# Patient Record
Sex: FEMALE
[De-identification: names, ages, dates, MRNs, and addresses within clinical notes are randomized per-mention and may not be internally consistent; named-entity substitution may affect disease eponyms.]

---

## 2021-03-05 PROBLEM — Z00.00 ENCOUNTER FOR PREVENTIVE HEALTH EXAMINATION: Status: ACTIVE | Noted: 2021-03-05

## 2021-03-12 ENCOUNTER — APPOINTMENT (OUTPATIENT)
Dept: DISASTER EMERGENCY | Facility: CLINIC | Age: 76
End: 2021-03-12

## 2021-03-13 LAB — SARS-COV-2 N GENE NPH QL NAA+PROBE: NOT DETECTED

## 2021-04-16 ENCOUNTER — APPOINTMENT (OUTPATIENT)
Dept: DISASTER EMERGENCY | Facility: CLINIC | Age: 76
End: 2021-04-16

## 2021-04-17 LAB — SARS-COV-2 N GENE NPH QL NAA+PROBE: NOT DETECTED

## 2021-05-13 DIAGNOSIS — Z01.818 ENCOUNTER FOR OTHER PREPROCEDURAL EXAMINATION: ICD-10-CM

## 2021-05-14 ENCOUNTER — APPOINTMENT (OUTPATIENT)
Dept: DISASTER EMERGENCY | Facility: CLINIC | Age: 76
End: 2021-05-14

## 2021-05-15 LAB — SARS-COV-2 N GENE NPH QL NAA+PROBE: NOT DETECTED

## 2022-05-13 ENCOUNTER — APPOINTMENT (OUTPATIENT)
Dept: PAIN MANAGEMENT | Facility: CLINIC | Age: 77
End: 2022-05-13
Payer: MEDICARE

## 2022-05-13 VITALS — BODY MASS INDEX: 24.17 KG/M2 | WEIGHT: 128 LBS | HEIGHT: 61 IN

## 2022-05-13 PROCEDURE — 99214 OFFICE O/P EST MOD 30 MIN: CPT

## 2022-05-13 NOTE — DISCUSSION/SUMMARY
[de-identified] : priro tfesi on year prior with 85% relief of pain and adls\par will repeat  left l3-4 l4-5 tfesi

## 2022-05-13 NOTE — PHYSICAL EXAM
[de-identified] : PHYSICAL EXAM\par \par Constitutional: \par Appears well, no apparent distress\par Ability to communicate: Normal\par Respiratory: non-labored breathing\par Skin: no rash noted\par Head: normocephalic, atraumatic\par Neck: no visible thyroid enlargement\par Eyes: extraocular movements intact\par Neurologic: alert and oriented x3\par Psychiatric: normal mood, affect, and behavior\par \par Lumbar Spine: \par Palpation: left lumbar paraspinal spasm and left lumbar paraspinal tenderness to palpation.\par ROM: Diminished range of motion in all plains.  Patient notes pain with lateral bending to the left.\par MMT: Motor exam is 5/5 through out bilateral lower extremities.\par Sensation: Light touch and pain is intact throughout bilateral lower extremities.\par Reflexes: achilles and patella reflexes are intact and  symmetrical.  No sustained clonus.\par Special Testing: Positive straight leg raise on the left side.\par \par Assessemnt:\par Radiculopathy of lumbosacral region (M54.17)\par Myalgia (M79.10)\par \par Plan:\par After discussing various treatment options with the patient including but not limited to oral medications, physical therapy, exercise modalities as well as interventional spinal injections, we have decided with the following plan:\par The patient is presenting with acute/sub-acute radicular pain with impairment in ADLs and functionality.  The pain has not responded to conservative care including NSAID therapy and/or physical therapy.  There is no bleeding tendency, unstable medical condition, or systemic infection\par \par The risks, benefits and alternatives of the proposed procedure were explained in detail with the patient.  The risks outlined include but are not limited to infection, bleeding, post dural puncture headache, nerve injury, a temporary increase in pain, failure to resolve symptoms, allergic reaction, symptom recurrence, and possible elevation of blood sugar.  All questions were answered to patient's satisfaction and he/she verbalized an understanding.\par \par Follow up 1-2 weeks post injection foe re-evaluation.\par \par Continue home exercises, stretching, activity modification, physical therapy, and conservative care.\par \par \par

## 2022-05-13 NOTE — HISTORY OF PRESENT ILLNESS
[Lower back] : lower back [10] : 10 [Radiating] : radiating [Intermittent] : intermittent [Injection therapy] : injection therapy [Standing] : standing [Walking] : walking [Retired] : Work status: retired [de-identified] : following for lower back pain which goes into the left leg  [] : no

## 2022-05-23 LAB — SARS-COV-2 N GENE NPH QL NAA+PROBE: NOT DETECTED

## 2022-05-25 ENCOUNTER — APPOINTMENT (OUTPATIENT)
Dept: PAIN MANAGEMENT | Facility: CLINIC | Age: 77
End: 2022-05-25
Payer: MEDICARE

## 2022-05-25 PROCEDURE — 64483 NJX AA&/STRD TFRM EPI L/S 1: CPT | Mod: LT

## 2022-05-25 PROCEDURE — 64484 NJX AA&/STRD TFRM EPI L/S EA: CPT | Mod: LT

## 2022-05-25 NOTE — PROCEDURE
[FreeTextEntry3] : Date of Service: 05/25/2022 \par \par Account: [id number]\par \par Patient: GENIA ALTMAN \par \par YOB: 1945\par \par Age: 76 year\par \par \par Surgeon: Marco Gonzalez M.D.\par \par Assistant: None.\par \par Pre-Operative Diagnosis: Lumbosacral radiculitis\par \par Post-Operative Diagnosis: Lumbosacral radiculitis\par \par Procedure:  Left L3-4, L4-5 transforaminal epidural steroid injection under fluoroscopic guidance.                     \par \par Anesthesia:  MAC\par \par \par This procedure was carried out using fluoroscopic guidance.  The risks and benefits of the procedure were discussed extensively with the patient.  The consent of the patient was obtained and the following procedure was performed. The patient was placed in the prone position on the fluoroscopic table and the lumbar area was prepped and draped in a sterile fashion.\par \par The left L3-4 neural foramen was identified on left oblique "chip dog" anatomical view at the 6 o' clock position using fluoroscopic guidance, and the area was marked. The overlying skin and subcutaneous structures were anesthetized using sterile technique with 1% Lidocaine.  A 22 gauge spinal needle was directed toward the inferior (6 o'clock) position of the pedicle, which formed the roof of the identified foramen.  Once in the epidural space, after negative aspiration for heme and CSF, 1cc of Omnipaque contrast was injected to confirm epidural location and assess filling defects and rule out intravascular needle placement. \par \par Lumbar Epidurogram showed no intravascular or intrathecal flow pattern.  No blood or CSF was aspirated. Omnipaque spread medially in epidural space and outlined the exiting nerve root.  \par \par After this, an injectate of 3 cc preservative free normal saline plus 40 mg of kenalog was injected in the epidural space.\par \par The left L4-5 neural foramen was identified on left oblique "chip dog" anatomical view at the 6 o' clock position using fluoroscopic guidance, and the area was marked. The overlying skin and subcutaneous structures were anesthetized using sterile technique with 1% Lidocaine.  A 22 gauge spinal needle was directed toward the inferior (6 o'clock) position of the pedicle, which formed the roof of the identified foramen.  Once in the epidural space, after negative aspiration for heme and CSF, 1cc of Omnipaque contrast was injected to confirm epidural location and assess filling defects and rule out intravascular needle placement. \par \par Lumbar Epidurogram showed no intravascular or intrathecal flow pattern.  No blood or CSF was aspirated. Omnipaque spread medially in epidural space and outlined the exiting nerve root.  \par \par After this, an injectate of 3 cc preservative free normal saline plus 40mg of kenalog was injected in the epidural space.\par \par The needle was subsequently removed.  Vital signs remained normal.  Pulse oximeter was used throughout the procedure and the patient's pulse and oxygen saturation remained within normal limits.  The patient tolerated the procedure well.  There were no complications.  The patient was instructed to apply ice over the injection sites for twenty minutes every two hours for the next 24 to 48 hours.  The patient was also instructed to contact me immediately if there were any problems.\par \par \par Marco Gonzalez M.D.\par

## 2022-06-08 ENCOUNTER — APPOINTMENT (OUTPATIENT)
Dept: PAIN MANAGEMENT | Facility: CLINIC | Age: 77
End: 2022-06-08
Payer: MEDICARE

## 2022-06-08 VITALS — BODY MASS INDEX: 24.17 KG/M2 | HEIGHT: 61 IN | WEIGHT: 128 LBS

## 2022-06-08 DIAGNOSIS — M70.62 TROCHANTERIC BURSITIS, LEFT HIP: ICD-10-CM

## 2022-06-08 PROCEDURE — 20610 DRAIN/INJ JOINT/BURSA W/O US: CPT

## 2022-06-08 PROCEDURE — J3490M: CUSTOM

## 2022-06-08 PROCEDURE — 99214 OFFICE O/P EST MOD 30 MIN: CPT | Mod: 25

## 2022-06-08 NOTE — PROCEDURE
[Large Joint Injection] : Large joint injection [Left] : of the left [Greater Trochanteric Bursa] : greater trochanteric bursa [___ cc    0.25%] : Bupivacaine (Marcaine) ~Vcc of 0.25%  [___ cc    40mg] : Triamcinolone (Kenalog) ~Vcc of 40 mg  [] : Patient tolerated procedure well [Call if redness, pain or fever occur] : call if redness, pain or fever occur [Patient had decreased mobility in the joint] : patient had decreased mobility in the joint [Risks, benefits, alternatives discussed / Verbal consent obtained] : the risks benefits, and alternatives have been discussed, and verbal consent was obtained

## 2022-06-08 NOTE — HISTORY OF PRESENT ILLNESS
[Lower back] : lower back [10] : 10 [5] : 5 [Intermittent] : intermittent [Rest] : rest [Injection therapy] : injection therapy [Sitting] : sitting [Standing] : standing [Walking] : walking [Lying in bed] : lying in bed [Part time] : Work status: part time [] : no [FreeTextEntry6] : soreness , pressure  [de-identified] : mri l spine  [TWNoteComboBox1] : 70%

## 2022-06-08 NOTE — REASON FOR VISIT
[FreeTextEntry2] : f/u to injection\par Procedure: Left L3-4,L4-5 transforaminal epidural steroid injection under fluoroscopic guidance \par Anesthesia:MAC (DOS: 05/25/2022)

## 2022-08-31 ENCOUNTER — APPOINTMENT (OUTPATIENT)
Dept: PAIN MANAGEMENT | Facility: CLINIC | Age: 77
End: 2022-08-31

## 2022-08-31 PROCEDURE — 64484 NJX AA&/STRD TFRM EPI L/S EA: CPT | Mod: LT

## 2022-08-31 PROCEDURE — 64483 NJX AA&/STRD TFRM EPI L/S 1: CPT | Mod: LT

## 2022-08-31 NOTE — PROCEDURE
[FreeTextEntry3] : Date of Service: 08/31/2022 \par \par Account: [id number]\par \par Patient: GENIA ALTMAN \par \par YOB: 1945\par \par Age: 76 year\par \par \par Surgeon: Marco Gonzalez M.D.\par \par Assistant: None.\par \par Pre-Operative Diagnosis: Lumbosacral radiculitis\par \par Post-Operative Diagnosis: Lumbosacral radiculitis\par \par Procedure:  Left L3-4, L4-5 transforaminal epidural steroid injection under fluoroscopic guidance.                     \par \par Anesthesia:  MAC\par \par \par This procedure was carried out using fluoroscopic guidance.  The risks and benefits of the procedure were discussed extensively with the patient.  The consent of the patient was obtained and the following procedure was performed. The patient was placed in the prone position on the fluoroscopic table and the lumbar area was prepped and draped in a sterile fashion.\par \par The left L3-4 neural foramen was identified on left oblique "chip dog" anatomical view at the 6 o' clock position using fluoroscopic guidance, and the area was marked. The overlying skin and subcutaneous structures were anesthetized using sterile technique with 1% Lidocaine.  A 22 gauge spinal needle was directed toward the inferior (6 o'clock) position of the pedicle, which formed the roof of the identified foramen.  Once in the epidural space, after negative aspiration for heme and CSF, 1cc of Omnipaque contrast was injected to confirm epidural location and assess filling defects and rule out intravascular needle placement. \par \par Lumbar Epidurogram showed no intravascular or intrathecal flow pattern.  No blood or CSF was aspirated. Omnipaque spread medially in epidural space and outlined the exiting nerve root.  \par \par After this, an injectate of 3 cc preservative free normal saline plus 40 mg of kenalog was injected in the epidural space.\par \par The left L4-5 neural foramen was identified on left oblique "chip dog" anatomical view at the 6 o' clock position using fluoroscopic guidance, and the area was marked. The overlying skin and subcutaneous structures were anesthetized using sterile technique with 1% Lidocaine.  A 22 gauge spinal needle was directed toward the inferior (6 o'clock) position of the pedicle, which formed the roof of the identified foramen.  Once in the epidural space, after negative aspiration for heme and CSF, 1cc of Omnipaque contrast was injected to confirm epidural location and assess filling defects and rule out intravascular needle placement. \par \par Lumbar Epidurogram showed no intravascular or intrathecal flow pattern.  No blood or CSF was aspirated. Omnipaque spread medially in epidural space and outlined the exiting nerve root.  \par \par After this, an injectate of 3 cc preservative free normal saline plus 40mg of kenalog was injected in the epidural space.\par \par The needle was subsequently removed.  Vital signs remained normal.  Pulse oximeter was used throughout the procedure and the patient's pulse and oxygen saturation remained within normal limits.  The patient tolerated the procedure well.  There were no complications.  The patient was instructed to apply ice over the injection sites for twenty minutes every two hours for the next 24 to 48 hours.  The patient was also instructed to contact me immediately if there were any problems.\par \par \par Marco Gonzalez M.D.\par

## 2022-10-12 ENCOUNTER — APPOINTMENT (OUTPATIENT)
Dept: PAIN MANAGEMENT | Facility: CLINIC | Age: 77
End: 2022-10-12

## 2023-03-10 ENCOUNTER — APPOINTMENT (OUTPATIENT)
Dept: PAIN MANAGEMENT | Facility: CLINIC | Age: 78
End: 2023-03-10
Payer: MEDICARE

## 2023-03-10 ENCOUNTER — FORM ENCOUNTER (OUTPATIENT)
Age: 78
End: 2023-03-10

## 2023-03-10 VITALS — BODY MASS INDEX: 23.6 KG/M2 | WEIGHT: 125 LBS | HEIGHT: 61 IN

## 2023-03-10 DIAGNOSIS — M54.17 RADICULOPATHY, LUMBOSACRAL REGION: ICD-10-CM

## 2023-03-10 PROCEDURE — 20552 NJX 1/MLT TRIGGER POINT 1/2: CPT

## 2023-03-10 PROCEDURE — J3490M: CUSTOM

## 2023-03-10 PROCEDURE — 99214 OFFICE O/P EST MOD 30 MIN: CPT | Mod: 25

## 2023-03-10 RX ORDER — GABAPENTIN 300 MG/1
300 CAPSULE ORAL
Refills: 0 | Status: ACTIVE | COMMUNITY

## 2023-03-10 RX ORDER — LOSARTAN POTASSIUM 100 MG/1
100 TABLET, FILM COATED ORAL
Refills: 0 | Status: ACTIVE | COMMUNITY

## 2023-03-10 RX ORDER — LEVOTHYROXINE SODIUM 0.05 MG/1
50 TABLET ORAL
Refills: 0 | Status: ACTIVE | COMMUNITY

## 2023-03-10 RX ORDER — BACLOFEN 10 MG/1
10 TABLET ORAL
Refills: 0 | Status: ACTIVE | COMMUNITY

## 2023-03-10 RX ORDER — MELOXICAM 15 MG/1
15 TABLET ORAL
Refills: 0 | Status: ACTIVE | COMMUNITY

## 2023-03-10 RX ORDER — SERTRALINE 25 MG/1
25 TABLET, FILM COATED ORAL
Refills: 0 | Status: ACTIVE | COMMUNITY

## 2023-03-10 NOTE — PHYSICAL EXAM
[de-identified] : PHYSICAL EXAM\par \par Constitutional: \par Appears well, no apparent distress\par Ability to communicate: Normal\par Respiratory: non-labored breathing\par Skin: no rash noted\par Head: normocephalic, atraumatic\par Neck: no visible thyroid enlargement\par Eyes: extraocular movements intact\par Neurologic: alert and oriented x3\par Psychiatric: normal mood, affect, and behavior\par \par Lumbar Spine: \par Palpation: left and right lumbar paraspinal spasm and left and right lumbar paraspinal tenderness to palpation.\par ROM: Diminished range of motion in all plains.  Patient notes pain with lateral bending to the left and right.\par MMT: Motor exam is 5/5 through out bilateral lower extremities.\par Sensation: Light touch and pain is intact throughout bilateral lower extremities.\par Reflexes: achilles and patella reflexes are intact and  symmetrical.  No sustained clonus.\par Special Testing: Positive straight leg raise on the left and right side.\par \par Assessemnt:\par Radiculopathy of lumbosacral region (M54.17)\par Myalgia (M79.10)\par \par

## 2023-03-10 NOTE — ASSESSMENT
[FreeTextEntry1] : pain is across lower back and into bl thigh anteriorly\par \par prior L TFESI she had some relief \par \par

## 2023-03-10 NOTE — HISTORY OF PRESENT ILLNESS
[Lower back] : lower back [10] : 10 [Radiating] : radiating [Constant] : constant [FreeTextEntry1] : 3/10/23: patient is following for lower back pain , the pain radiates to both sides she states it stops at the thighs  [] : no [FreeTextEntry6] : spasm  [FreeTextEntry7] : b/l sides [FreeTextEntry9] : ointments  [de-identified] : getting out of a car

## 2023-03-11 ENCOUNTER — APPOINTMENT (OUTPATIENT)
Dept: MRI IMAGING | Facility: CLINIC | Age: 78
End: 2023-03-11
Payer: MEDICARE

## 2023-03-11 PROCEDURE — 72148 MRI LUMBAR SPINE W/O DYE: CPT

## 2023-03-29 ENCOUNTER — APPOINTMENT (OUTPATIENT)
Dept: PAIN MANAGEMENT | Facility: CLINIC | Age: 78
End: 2023-03-29
Payer: MEDICARE

## 2023-03-29 VITALS — HEIGHT: 61 IN | BODY MASS INDEX: 23.6 KG/M2 | WEIGHT: 125 LBS

## 2023-03-29 PROCEDURE — 20552 NJX 1/MLT TRIGGER POINT 1/2: CPT

## 2023-03-29 PROCEDURE — J3490M: CUSTOM

## 2023-03-29 PROCEDURE — 99214 OFFICE O/P EST MOD 30 MIN: CPT | Mod: 25

## 2023-03-29 NOTE — HISTORY OF PRESENT ILLNESS
[Lower back] : lower back [3] : 3 [Radiating] : radiating [Intermittent] : intermittent [Rest] : rest [Standing] : standing [Walking] : walking [Bending forward] : bending forward [FreeTextEntry1] : pt is following up for mri results  [] : no [FreeTextEntry7] : into the legs

## 2023-03-29 NOTE — PHYSICAL EXAM
[de-identified] : PHYSICAL EXAM\par \par Constitutional: \par Appears well, no apparent distress\par Ability to communicate: Normal\par Respiratory: non-labored breathing\par Skin: no rash noted\par Head: normocephalic, atraumatic\par Neck: no visible thyroid enlargement\par Eyes: extraocular movements intact\par Neurologic: alert and oriented x3\par Psychiatric: normal mood, affect, and behavior\par \par Lumbar Spine: \par Palpation: left and right lumbar paraspinal spasm and left and right lumbar paraspinal tenderness to palpation.\par ROM: Diminished range of motion in all plains.  Patient notes pain with lateral bending to the left and right.\par MMT: Motor exam is 5/5 through out bilateral lower extremities.\par Sensation: Light touch and pain is intact throughout bilateral lower extremities.\par Reflexes: achilles and patella reflexes are intact and  symmetrical.  No sustained clonus.\par Special Testing: Positive kemps maneuver on the left and right side\par \par Assessemnt:\par Lumbar spondylosis (m47.816)\par Myalgia (M79.10)\par \par Plan:\par After discussing various treatment options with the patient including but not limited to oral medications, physical therapy, exercise modalities as well as interventional spinal injections, we have decided with the following plan:\par The patient is presenting with axial lumbar pain that has not responded to three months of conservative therapy including physical therapy or nsaid therapy. The pain is interfering with activities of daily living and functionality.  There is no radicular pain.  The pain is exacerbated by facet loading.  Positive kemps maneuver which is defined by pain reproduction with extension and rotation of the lumbar spine to the affected side.  The patient has not had a vertebral fusion at the levels of the proposed treatment.  There is no unexplained neurologic deficit.  There is no bleeding tendency, unstable medical condition, or systemic infection.  The injection is being performed to diagnose the facet joint as the source of the individuals pain.\par \par The risks, benefits and alternatives of the proposed procedure were explained in detail with the patient.  The risks outlined include but are not limited to infection, bleeding, post dural puncture headache, nerve injury, a temporary increase in pain, failure to resolve symptoms, allergic reaction, symptom recurrence, and possible elevation of blood sugar.  All questions were answered to patient's satisfaction and he/she verbalized an understanding.\par \par Follow up 1-2 weeks post injection foe re-evaluation.\par \par Continue home exercises, stretching, activity modification, physical therapy, and conservative care.\par \par \par \par

## 2023-03-29 NOTE — DISCUSSION/SUMMARY
[Surgical risks reviewed] : Surgical risks reviewed [de-identified] : proceed BL LS MBB\par \par After discussing various treatment options with the patient including but not limited to oral medications, physical therapy, exercise, modalities as well as interventional spinal injections, we have decided with the following plan:\par I personally reviewed the MRI/CT scan images and agree with the radiologist's report. The radiological findings were discussed with the patient.\par The risks, benefits, contents and alternatives to injection were explained in full to the patient. Risks outlined include but are not limited to infection,sepsis, bleeding, post-dural puncture headache, nerve damage, temporary increase in pain, syncopal episode, failure to resolve symptoms, allergic reaction, symptom recurrence, and elevation of blood sugar in diabetics. Cortisone may cause immunosuppression. Patient understands the risks. All questions were answered. After discussion of options, patient requested an injection. Information regarding the injection was given to the patient. Which medications to stop prior to the injection was explained to the patient as well.\par Follow up in 1-2 weeks post injection for re-evaluation.\par Continue Home exercises, stretching, activity modification, physical therapy, and conservative care.\par Patient is presenting with acute/sub-acute radicular pain with impairment in ADLs and functionality. The pain has not responded to conservative care including nsaid therapy and/or physical therapy. There is no bleeding tendency, unstable medical condition, or systemic infection.\par

## 2023-04-10 ENCOUNTER — APPOINTMENT (OUTPATIENT)
Dept: PAIN MANAGEMENT | Facility: CLINIC | Age: 78
End: 2023-04-10
Payer: MEDICARE

## 2023-04-10 PROCEDURE — J3490M: CUSTOM

## 2023-04-10 PROCEDURE — 64494 INJ PARAVERT F JNT L/S 2 LEV: CPT | Mod: 50

## 2023-04-10 PROCEDURE — 64493 INJ PARAVERT F JNT L/S 1 LEV: CPT | Mod: 50

## 2023-04-10 NOTE — PROCEDURE
[FreeTextEntry3] : Date of Service: 04/10/2023 \par \par Account: [id number]\par \par Patient: GENIA ALTMAN \par \par YOB: 1945\par \par Age: 77 year\par \par \par Surgeon:  Marco Gonzalez M.D.\par \par Assistant: None.\par \par Pre-Operative Diagnosis: Spondylosis of lumbar region without myelopathy or radiculopathy\par \par Post Operative Diagnosis:  Spondylosis of lumbar region without myelopathy or radiculopathy\par \par Procedure: Right L3,L4,L5 Medial Branch block \par                    Left L3,L4,L5  Medial Branch block under fluoroscopic guidance\par \par Anesthesia:      MAC\par \par This procedure was carried out using fluoroscopic guidance.  The risks and benefits of the procedure were discussed extensively with the patient.  The consent of the patient was obtained and the following procedure was performed.\par \par  The patient was placed in the prone position.  The patient's back was prepped and draped in a sterile fashion.  The left L4 and L5 lumbar vertebral bodies were identified and the fluoroscope left obliqued to approximately 30 degrees to reveal good "Estuardo-dog" anatomical view.  The junction of the superior articulate process and tranverse process at the L4 and L5 level was then identified and marked. The skin at these target points was then localized using 1 cc of 1% Lidocaine without epinephrine at each injection site.  A spinal needle was then introduced and advanced to the above target points at the junction of the SAP and transverse processes until oss was contacted.  After negative aspiration for heme and CSF, an injectate of 1cc 0.25% marcaine  was injected at each of the two levels. \par \par The right L4 and L5 lumbar vertebral bodies were identified and the fluoroscope right obliqued to approximately 30 degrees to reveal good "Estuardo-dog" anatomical view.  The junction of the superior articulate process and tranverse process at the L4 and L5 level was then identified and marked. The skin at these target points was then localized using 1 cc of 1% Lidocaine without epinephrine at each injection site.  A spinal needle was then introduced and advanced to the above target points at the junction of the SAP and transverse processes until oss was contacted.  After negative aspiration for heme and CSF, an injectate of 1cc 0.25% was injected at each of the two levels. \par \par  Fluoroscope then focused on the bilateral sacral ala on AP view, and marked at these points.  The skin and subcutaneous structures were localized using 1cc of 1.0 % lidocaine without epinephrine.  A spinal needle was then advanced under fluoroscopic guidance until oss was contacted at the ala bilaterally.  After negative aspiration for heme and CSF, an injectate of 1cc 0.25% marcaine was injected at each site.\par \par The needles were then removed and pressure was applied.  Anesthesia personnel were present throughout the procedure, monitoring vitals which were stable throughout.\par \par \par Marco Gonzalez M.D.\par

## 2023-05-03 ENCOUNTER — APPOINTMENT (OUTPATIENT)
Dept: PAIN MANAGEMENT | Facility: CLINIC | Age: 78
End: 2023-05-03
Payer: MEDICARE

## 2023-05-03 VITALS — BODY MASS INDEX: 23.6 KG/M2 | HEIGHT: 61 IN | WEIGHT: 125 LBS

## 2023-05-03 PROCEDURE — J3490M: CUSTOM

## 2023-05-03 PROCEDURE — 20552 NJX 1/MLT TRIGGER POINT 1/2: CPT

## 2023-05-03 PROCEDURE — 99214 OFFICE O/P EST MOD 30 MIN: CPT | Mod: 25

## 2023-05-03 NOTE — ASSESSMENT
[FreeTextEntry1] : BL LS MBB with >80% relief improved rom and adls sustained , L sided pain has returned > R side \par aggravated with getting out of car, getting up in morning

## 2023-05-03 NOTE — PHYSICAL EXAM
[de-identified] : PHYSICAL EXAM\par \par Constitutional: \par Appears well, no apparent distress\par Ability to communicate: Normal\par Respiratory: non-labored breathing\par Skin: no rash noted\par Head: normocephalic, atraumatic\par Neck: no visible thyroid enlargement\par Eyes: extraocular movements intact\par Neurologic: alert and oriented x3\par Psychiatric: normal mood, affect, and behavior\par \par Lumbar Spine: \par Palpation: left and right lumbar paraspinal spasm and left and right lumbar paraspinal tenderness to palpation.\par ROM: Diminished range of motion in all plains.  Patient notes pain with lateral bending to the left and right.\par MMT: Motor exam is 5/5 through out bilateral lower extremities.\par Sensation: Light touch and pain is intact throughout bilateral lower extremities.\par Reflexes: achilles and patella reflexes are intact and  symmetrical.  No sustained clonus.\par Special Testing: Positive kemps maneuver on the left and right side\par \par Assessemnt:\par Lumbar spondylosis (m47.816)\par Myalgia (M79.10)\par \par Plan:\par After discussing various treatment options with the patient including but not limited to oral medications, physical therapy, exercise modalities as well as interventional spinal injections, we have decided with the following plan:\par The patient is presenting with axial lumbar pain that has not responded to three months of conservative therapy including physical therapy or nsaid therapy. The pain is interfering with activities of daily living and functionality.  There is no radicular pain.  The pain is exacerbated by facet loading.  Positive kemps maneuver which is defined by pain reproduction with extension and rotation of the lumbar spine to the affected side.  The patient has not had a vertebral fusion at the levels of the proposed treatment.  There is no unexplained neurologic deficit.  There is no bleeding tendency, unstable medical condition, or systemic infection.  The injection is being performed to diagnose the facet joint as the source of the individuals pain.\par \par The risks, benefits and alternatives of the proposed procedure were explained in detail with the patient.  The risks outlined include but are not limited to infection, bleeding, post dural puncture headache, nerve injury, a temporary increase in pain, failure to resolve symptoms, allergic reaction, symptom recurrence, and possible elevation of blood sugar.  All questions were answered to patient's satisfaction and he/she verbalized an understanding.\par \par Follow up 1-2 weeks post injection foe re-evaluation.\par \par Continue home exercises, stretching, activity modification, physical therapy, and conservative care.\par \par \par \par

## 2023-05-03 NOTE — HISTORY OF PRESENT ILLNESS
[Lower back] : lower back [5] : 5 [2] : 2 [Radiating] : radiating [Constant] : constant [Injection therapy] : injection therapy [FreeTextEntry1] : pt is following up after bilateral l spine procedure , she states left side is still bothering her  [] : no [de-identified] : getting up in the morning

## 2023-05-03 NOTE — REASON FOR VISIT
[Follow-Up Visit] : a follow-up pain management visit [FreeTextEntry2] : BILATERAL L3,L4,L5 MBB ( DOS 04/10/23- MAC )

## 2023-05-03 NOTE — PROCEDURE
[Trigger point 1-2 muscle groups] : trigger point 1-2 muscle groups [Bilateral] : bilaterally of the [Gluteus Jt muscle] : gluteus jt muscle [Pain] : pain [Inflammation] : inflammation [Alcohol] : alcohol [Ethyl Chloride sprayed topically] : ethyl chloride sprayed topically [Sterile technique used] : sterile technique used [___ cc    1%] : Lidocaine ~Vcc of 1%  [___ cc    0.25%] : Bupivacaine (Marcaine) ~Vcc of 0.25%  [___ cc    10mg] : Triamcinolone (Kenalog) ~Vcc of 10 mg  [] : Patient tolerated procedure well [Call if redness, pain or fever occur] : call if redness, pain or fever occur [Risks, benefits, alternatives discussed / Verbal consent obtained] : the risks benefits, and alternatives have been discussed, and verbal consent was obtained

## 2023-05-03 NOTE — DISCUSSION/SUMMARY
[Surgical risks reviewed] : Surgical risks reviewed [de-identified] : proceed BL LS MBB #2\par \par After discussing various treatment options with the patient including but not limited to oral medications, physical therapy, exercise, modalities as well as interventional spinal injections, we have decided with the following plan:\par I personally reviewed the MRI/CT scan images and agree with the radiologist's report. The radiological findings were discussed with the patient.\par The risks, benefits, contents and alternatives to injection were explained in full to the patient. Risks outlined include but are not limited to infection,sepsis, bleeding, post-dural puncture headache, nerve damage, temporary increase in pain, syncopal episode, failure to resolve symptoms, allergic reaction, symptom recurrence, and elevation of blood sugar in diabetics. Cortisone may cause immunosuppression. Patient understands the risks. All questions were answered. After discussion of options, patient requested an injection. Information regarding the injection was given to the patient. Which medications to stop prior to the injection was explained to the patient as well.\par Follow up in 1-2 weeks post injection for re-evaluation.\par Continue Home exercises, stretching, activity modification, physical therapy, and conservative care.\par Patient is presenting with acute/sub-acute radicular pain with impairment in ADLs and functionality. The pain has not responded to conservative care including nsaid therapy and/or physical therapy. There is no bleeding tendency, unstable medical condition, or systemic infection.\par

## 2023-05-12 ENCOUNTER — APPOINTMENT (OUTPATIENT)
Dept: PAIN MANAGEMENT | Facility: CLINIC | Age: 78
End: 2023-05-12
Payer: MEDICARE

## 2023-05-12 PROCEDURE — 64494 INJ PARAVERT F JNT L/S 2 LEV: CPT | Mod: LT

## 2023-05-12 PROCEDURE — 64493 INJ PARAVERT F JNT L/S 1 LEV: CPT | Mod: 50

## 2023-05-12 PROCEDURE — J3490M: CUSTOM

## 2023-05-12 PROCEDURE — 64495 INJ PARAVERT F JNT L/S 3 LEV: CPT | Mod: LT

## 2023-05-12 NOTE — PROCEDURE
[FreeTextEntry3] : Date of Service: 05/12/2023 \par \par Account: [id number]\par \par Patient: GENIA ALTMAN \par \par YOB: 1945\par \par Age: 77 year\par \par \par Surgeon:  Marco Gonzaelz M.D.\par \par Assistant: None.\par \par Pre-Operative Diagnosis: Spondylosis of lumbar region without myelopathy or radiculopathy\par \par Post Operative Diagnosis:  Spondylosis of lumbar region without myelopathy or radiculopathy\par \par Procedure: Right L3,L4,L5 Medial Branch block \par                    Left L3,L4,L5  Medial Branch block under fluoroscopic guidance\par \par Anesthesia:      MAC\par \par This procedure was carried out using fluoroscopic guidance.  The risks and benefits of the procedure were discussed extensively with the patient.  The consent of the patient was obtained and the following procedure was performed.\par \par  The patient was placed in the prone position.  The patient's back was prepped and draped in a sterile fashion.  The left L4 and L5 lumbar vertebral bodies were identified and the fluoroscope left obliqued to approximately 30 degrees to reveal good "Estuardo-dog" anatomical view.  The junction of the superior articulate process and tranverse process at the L4 and L5 level was then identified and marked. The skin at these target points was then localized using 1 cc of 1% Lidocaine without epinephrine at each injection site.  A spinal needle was then introduced and advanced to the above target points at the junction of the SAP and transverse processes until oss was contacted.  After negative aspiration for heme and CSF, an injectate of 1cc 0.25% marcaine  was injected at each of the two levels. \par \par The right L4 and L5 lumbar vertebral bodies were identified and the fluoroscope right obliqued to approximately 30 degrees to reveal good "Estuardo-dog" anatomical view.  The junction of the superior articulate process and tranverse process at the L4 and L5 level was then identified and marked. The skin at these target points was then localized using 1 cc of 1% Lidocaine without epinephrine at each injection site.  A spinal needle was then introduced and advanced to the above target points at the junction of the SAP and transverse processes until oss was contacted.  After negative aspiration for heme and CSF, an injectate of 1cc 0.25% was injected at each of the two levels. \par \par  Fluoroscope then focused on the bilateral sacral ala on AP view, and marked at these points.  The skin and subcutaneous structures were localized using 1cc of 1.0 % lidocaine without epinephrine.  A spinal needle was then advanced under fluoroscopic guidance until oss was contacted at the ala bilaterally.  After negative aspiration for heme and CSF, an injectate of 1cc 0.25% marcaine was injected at each site.\par \par The needles were then removed and pressure was applied.  Anesthesia personnel were present throughout the procedure, monitoring vitals which were stable throughout.\par \par \par Marco Gonzalez M.D.\par

## 2023-06-21 ENCOUNTER — APPOINTMENT (OUTPATIENT)
Dept: PAIN MANAGEMENT | Facility: CLINIC | Age: 78
End: 2023-06-21
Payer: MEDICARE

## 2023-06-21 VITALS — BODY MASS INDEX: 23.6 KG/M2 | WEIGHT: 125 LBS | HEIGHT: 61 IN

## 2023-06-21 DIAGNOSIS — M47.817 SPONDYLOSIS W/OUT MYELOPATHY OR RADICULOPATHY, LUMBOSACRAL REGION: ICD-10-CM

## 2023-06-21 DIAGNOSIS — M79.18 MYALGIA, OTHER SITE: ICD-10-CM

## 2023-06-21 PROCEDURE — 99214 OFFICE O/P EST MOD 30 MIN: CPT | Mod: 25

## 2023-06-21 PROCEDURE — J3490M: CUSTOM

## 2023-06-21 PROCEDURE — 20552 NJX 1/MLT TRIGGER POINT 1/2: CPT

## 2023-06-21 NOTE — DISCUSSION/SUMMARY
[Surgical risks reviewed] : Surgical risks reviewed [de-identified] : proceed BL LS RFA\par \par After discussing various treatment options with the patient including but not limited to oral medications, physical therapy, exercise, modalities as well as interventional spinal injections, we have decided with the following plan:\par I personally reviewed the MRI/CT scan images and agree with the radiologist's report. The radiological findings were discussed with the patient.\par The risks, benefits, contents and alternatives to injection were explained in full to the patient. Risks outlined include but are not limited to infection,sepsis, bleeding, post-dural puncture headache, nerve damage, temporary increase in pain, syncopal episode, failure to resolve symptoms, allergic reaction, symptom recurrence, and elevation of blood sugar in diabetics. Cortisone may cause immunosuppression. Patient understands the risks. All questions were answered. After discussion of options, patient requested an injection. Information regarding the injection was given to the patient. Which medications to stop prior to the injection was explained to the patient as well.\par Follow up in 1-2 weeks post injection for re-evaluation.\par Continue Home exercises, stretching, activity modification, physical therapy, and conservative care.\par Patient is presenting with acute/sub-acute radicular pain with impairment in ADLs and functionality. The pain has not responded to conservative care including nsaid therapy and/or physical therapy. There is no bleeding tendency, unstable medical condition, or systemic infection.\par

## 2023-06-21 NOTE — PHYSICAL EXAM
[de-identified] : PHYSICAL EXAM\par \par Constitutional: \par Appears well, no apparent distress\par Ability to communicate: Normal\par Respiratory: non-labored breathing\par Skin: no rash noted\par Head: normocephalic, atraumatic\par Neck: no visible thyroid enlargement\par Eyes: extraocular movements intact\par Neurologic: alert and oriented x3\par Psychiatric: normal mood, affect, and behavior\par \par Lumbar Spine: \par Palpation: left and right lumbar paraspinal spasm and left and right lumbar paraspinal tenderness to palpation.\par ROM: Diminished range of motion in all plains.  Patient notes pain with lateral bending to the left and right.\par MMT: Motor exam is 5/5 through out bilateral lower extremities.\par Sensation: Light touch and pain is intact throughout bilateral lower extremities.\par Reflexes: achilles and patella reflexes are intact and  symmetrical.  No sustained clonus.\par Special Testing: Positive kemps maneuver on the left and right side\par \par Assessemnt:\par Lumbar spondylosis (m47.816)\par Myalgia (M79.10)\par \par Plan:\par After discussing various treatment options with the patient including but not limited to oral medications, physical therapy, exercise modalities as well as interventional spinal injections, we have decided with the following plan:\par The patient is presenting with axial lumbar pain that has not responded to three months of conservative therapy including physical therapy or nsaid therapy. The pain is interfering with activities of daily living and functionality.  There is no radicular pain.  The pain is exacerbated by facet loading.  Positive kemps maneuver which is defined by pain reproduction with extension and rotation of the lumbar spine to the affected side.  The patient has not had a vertebral fusion at the levels of the proposed treatment.  There is no unexplained neurologic deficit.  There is no bleeding tendency, unstable medical condition, or systemic infection.  The injection is being performed to diagnose the facet joint as the source of the individuals pain.\par \par The risks, benefits and alternatives of the proposed procedure were explained in detail with the patient.  The risks outlined include but are not limited to infection, bleeding, post dural puncture headache, nerve injury, a temporary increase in pain, failure to resolve symptoms, allergic reaction, symptom recurrence, and possible elevation of blood sugar.  All questions were answered to patient's satisfaction and he/she verbalized an understanding.\par \par Follow up 1-2 weeks post injection foe re-evaluation.\par \par Continue home exercises, stretching, activity modification, physical therapy, and conservative care.\par \par \par \par

## 2023-06-21 NOTE — PROCEDURE
[Trigger point 1-2 muscle groups] : trigger point 1-2 muscle groups [Bilateral] : bilaterally of the [Lumbar paraspinal muscle] : lumbar paraspinal muscle [Pain] : pain [Inflammation] : inflammation [Alcohol] : alcohol [Ethyl Chloride sprayed topically] : ethyl chloride sprayed topically [Sterile technique used] : sterile technique used [___ cc    1%] : Lidocaine ~Vcc of 1%  [___ cc    0.25%] : Bupivacaine (Marcaine) ~Vcc of 0.25%  [___ cc    10mg] : Triamcinolone (Kenalog) ~Vcc of 10 mg

## 2023-06-21 NOTE — REASON FOR VISIT
[Follow-Up Visit] : a follow-up pain management visit [FreeTextEntry2] : BILATERAL L3,L4,L5 MBB (DOS 05/12/23-MAC)

## 2023-06-21 NOTE — HISTORY OF PRESENT ILLNESS
[Lower back] : lower back [5] : 5 [2] : 2 [Radiating] : radiating [Constant] : constant [Injection therapy] : injection therapy [FreeTextEntry1] : pt is following up after bilateral l spine procedure , she states left side is still bothering her  [] : no [de-identified] : getting up in the morning

## 2023-07-12 ENCOUNTER — APPOINTMENT (OUTPATIENT)
Dept: PAIN MANAGEMENT | Facility: CLINIC | Age: 78
End: 2023-07-12

## 2023-07-12 ENCOUNTER — APPOINTMENT (OUTPATIENT)
Dept: PAIN MANAGEMENT | Facility: CLINIC | Age: 78
End: 2023-07-12
Payer: MEDICARE

## 2023-07-12 PROCEDURE — 64636Z: CUSTOM | Mod: 50

## 2023-07-12 PROCEDURE — 64635 DESTROY LUMB/SAC FACET JNT: CPT | Mod: NC

## 2023-07-12 NOTE — PROCEDURE
[FreeTextEntry3] : Date of Service: 07/12/2023 \par \par Account: [id number]\par \par Patient: GENIA ALTMAN \par \par YOB: 1945\par \par Age: 77 year\par \par \par PREOPERATIVE DIAGNOSIS: Spondylosis of lumbar region without myelopathy or radiculopathy\par \par      1. \par \par           POSTOPERATIVE DIAGNOSIS: Spondylosis of lumbar region without myelopathy or radiculopathy\par \par      1. \par \par           PROCEDURE:\par \par           1) Right L3, L4, L5 and Left L3, L4, L5 medial branch radiofrequency ablation under fluoroscopic guidance.             \par \par Anesthesia:  MAC\par \par \par Risks, benefits and alternatives of the procedure were discussed with the patient after which she agreed to proceed.  Patient was brought into fluoroscopy suite and was placed in prone position with hip support. Back was prepped and draped in a sterile fashion x3. Anesthesia initiated. \par \par Under AP visualization, the right and left sacral ala was identified and marked. Using a 25 gauge ½ inch needle the skin and subcutaneous structures at this point were localized with 1% Lidocaine using approximately 3 cc's 1% Lidocaine.  After this, a 20 gauge 100mm Goran radiofrequency needle with a 10mm curved tip was inserted and using depth direction depth technique under constant fluoroscopic visualization, the needle was advanced to the sacral ala until os was contacted.  \par \par The camera was then redirected under AP view to visualize the right and left L4 and L5 vertebra. The camera was obliqued to approximately 30 degrees to reveal good Estuardo dog anatomical view. The junction of the superior articulate process and transverse process at the L4 and L5 levels  were then identified and marked. Skin and subcutaneous structures were then anesthetized with approximately 3 cc's of 1% Lidocaine at each of these levels. After which a 20 gauge 100mm Goran radiofrequency needle with a 10mm curved tip then advanced until the junction at the SAP and transverse process was met.  The camera was then directed through the lateral view and under constant fluoroscopic visualization, the needle tips were then advanced and confirmed at the junction of the SAP and transverse process.  \par \par The stylette for the most cephalad needle at the L4 level was then removed and a Goran radiofrequency probe was then placed inside the needle. After her pedis' were checked at approximately 300 ohm, 50 hertz sensory stimulation was performed.  Patient experienced concordant pain in his low back at approximately 0.3 volts. The voltage was then increased to 1 volt. The patient reported increased low back pain symptoms without any radiation below the knee.  Stimulation was then changed to 2 hertz and increased slowly by .1 volt increments at approximately 0.5 volts, patient began to experience thumping like reproductive pain in his low back. The voltage was then increased to approximately 2.5 volts. Patient experienced increasing thumping without any sensation below his knee. This exact stimulation was then repeated for the L5 needle as well as sacral ala needle with concordant pain and no radiation below the knee. The 6 levels were then anesthetized with approximately 0.5 cc's of 1% Lidocaine. After which each area was then ablated at 80 degrees centigrade for 60 seconds each. Patient felt no pain reproduction during the ablation procedure.  After each of these levels were ablated and injected approximately 1 cc of 0.25% Bupivacaine plus 10 mg of kenalog were then injected before the needles were removed.  Pressure was then applied to the low back. Band-aids were applied.  Patient was brought to the recovery, ambulated on his own after the procedure and reported decreased low back pain. \par \par Anesthesia personnel were present throughout the procedure. Patient was told to apply ice to the low back for 20 minutes on and 20 minutes off for focal symptoms for 24-48 hours. He is to call the office if he had any questions or concerns. \par \par \par Marco Gonzalez M.D.\par

## 2023-08-02 ENCOUNTER — APPOINTMENT (OUTPATIENT)
Dept: PAIN MANAGEMENT | Facility: CLINIC | Age: 78
End: 2023-08-02
Payer: MEDICARE

## 2023-08-02 VITALS — BODY MASS INDEX: 23.6 KG/M2 | HEIGHT: 61 IN | WEIGHT: 125 LBS

## 2023-08-02 PROCEDURE — 20552 NJX 1/MLT TRIGGER POINT 1/2: CPT

## 2023-08-02 PROCEDURE — J3490M: CUSTOM

## 2023-08-02 PROCEDURE — 99214 OFFICE O/P EST MOD 30 MIN: CPT | Mod: 25

## 2023-08-03 NOTE — PHYSICAL EXAM
[de-identified] : PHYSICAL EXAM  Constitutional:  Appears well, no apparent distress Ability to communicate: Normal Respiratory: non-labored breathing Skin: no rash noted Head: normocephalic, atraumatic Neck: no visible thyroid enlargement Eyes: extraocular movements intact Neurologic: alert and oriented x3 Psychiatric: normal mood, affect, and behavior  Lumbar Spine:  Palpation: left and right lumbar paraspinal spasm and left and right lumbar paraspinal tenderness to palpation. ROM: Diminished range of motion in all plains.  Patient notes pain with lateral bending to the left and right. MMT: Motor exam is 5/5 through out bilateral lower extremities. Sensation: Light touch and pain is intact throughout bilateral lower extremities. Reflexes: achilles and patella reflexes are intact and  symmetrical.  No sustained clonus. Special Testing: Positive kemps maneuver on the left and right side  Assessemnt: Lumbar spondylosis (m47.816) Myalgia (M79.10)

## 2023-08-03 NOTE — REASON FOR VISIT
[Follow-Up Visit] : a follow-up pain management visit [FreeTextEntry2] :  Right L3, L4, L5 and Left L3, L4, L5 medial branch radiofrequency ablation under fluoroscopic guidance

## 2023-08-03 NOTE — HISTORY OF PRESENT ILLNESS
[Lower back] : lower back [6] : 6 [Radiating] : radiating [Constant] : constant [Injection therapy] : injection therapy [Walking] : walking [Stairs] : stairs [FreeTextEntry1] : pt is following up after bilateral l spine procedure , she states left side is still bothering her  [] : no [de-identified] : getting up in the morning

## 2023-09-06 ENCOUNTER — APPOINTMENT (OUTPATIENT)
Dept: PAIN MANAGEMENT | Facility: CLINIC | Age: 78
End: 2023-09-06
Payer: MEDICARE

## 2023-09-06 VITALS — WEIGHT: 125 LBS | BODY MASS INDEX: 23.6 KG/M2 | HEIGHT: 61 IN

## 2023-09-06 DIAGNOSIS — M70.61 TROCHANTERIC BURSITIS, RIGHT HIP: ICD-10-CM

## 2023-09-06 DIAGNOSIS — M70.62 TROCHANTERIC BURSITIS, LEFT HIP: ICD-10-CM

## 2023-09-06 PROCEDURE — J3490M: CUSTOM

## 2023-09-06 PROCEDURE — 20610 DRAIN/INJ JOINT/BURSA W/O US: CPT | Mod: 50

## 2023-09-06 PROCEDURE — 99214 OFFICE O/P EST MOD 30 MIN: CPT | Mod: 25

## 2023-09-06 NOTE — HISTORY OF PRESENT ILLNESS
[Lower back] : lower back [6] : 6 [Radiating] : radiating [Constant] : constant [Injection therapy] : injection therapy [Walking] : walking [Stairs] : stairs [FreeTextEntry1] : pt is following up after bilateral l spine procedure , she states left side is still bothering her  [] : no [de-identified] : getting up in the morning

## 2023-09-06 NOTE — PHYSICAL EXAM
[Bilateral] : hip bilaterally [] : greater trochanteric tenderness [de-identified] : PHYSICAL EXAM  Constitutional:  Appears well, no apparent distress Ability to communicate: Normal Respiratory: non-labored breathing Skin: no rash noted Head: normocephalic, atraumatic Neck: no visible thyroid enlargement Eyes: extraocular movements intact Neurologic: alert and oriented x3 Psychiatric: normal mood, affect, and behavior  Lumbar Spine:  Palpation: left and right lumbar paraspinal spasm and left and right lumbar paraspinal tenderness to palpation. ROM: Diminished range of motion in all plains.  Patient notes pain with lateral bending to the left and right. MMT: Motor exam is 5/5 through out bilateral lower extremities. Sensation: Light touch and pain is intact throughout bilateral lower extremities. Reflexes: achilles and patella reflexes are intact and  symmetrical.  No sustained clonus. Special Testing: Positive kemps maneuver on the left and right side

## 2023-09-06 NOTE — PROCEDURE
[Large Joint Injection] : Large joint injection [Bilateral] : bilaterally of the [Greater Trochanteric Bursa] : greater trochanteric bursa [Pain] : pain [Inflammation] : inflammation [Alcohol] : alcohol [Betadine] : betadine [Ethyl Chloride sprayed topically] : ethyl chloride sprayed topically [Sterile technique used] : sterile technique used [___ cc    0.25%] : Bupivacaine (Marcaine) ~Vcc of 0.25%  [___ cc    40mg] : Triamcinolone (Kenalog) ~Vcc of 40 mg  [] : Patient tolerated procedure well [Call if redness, pain or fever occur] : call if redness, pain or fever occur [Risks, benefits, alternatives discussed / Verbal consent obtained] : the risks benefits, and alternatives have been discussed, and verbal consent was obtained

## 2025-07-15 ENCOUNTER — APPOINTMENT (OUTPATIENT)
Dept: PAIN MANAGEMENT | Facility: CLINIC | Age: 80
End: 2025-07-15
Payer: MEDICARE

## 2025-07-15 VITALS — HEIGHT: 60 IN | WEIGHT: 110 LBS | BODY MASS INDEX: 21.6 KG/M2

## 2025-07-15 PROBLEM — Z86.79 HISTORY OF HYPERTENSION: Status: RESOLVED | Noted: 2025-07-15 | Resolved: 2025-07-15

## 2025-07-15 PROCEDURE — 99214 OFFICE O/P EST MOD 30 MIN: CPT

## 2025-07-15 RX ORDER — GABAPENTIN 600 MG/1
600 TABLET, COATED ORAL 3 TIMES DAILY
Qty: 90 | Refills: 2 | Status: ACTIVE | COMMUNITY
Start: 2025-07-15 | End: 1900-01-01

## 2025-07-15 RX ORDER — EVOLOCUMAB 140 MG/ML
140 INJECTION, SOLUTION SUBCUTANEOUS
Refills: 0 | Status: ACTIVE | COMMUNITY

## 2025-08-01 ENCOUNTER — APPOINTMENT (OUTPATIENT)
Age: 80
End: 2025-08-01
Payer: MEDICARE

## 2025-08-01 PROCEDURE — 64483 NJX AA&/STRD TFRM EPI L/S 1: CPT | Mod: 50

## 2025-08-14 ENCOUNTER — APPOINTMENT (OUTPATIENT)
Dept: PAIN MANAGEMENT | Facility: CLINIC | Age: 80
End: 2025-08-14
Payer: MEDICARE

## 2025-08-14 VITALS — BODY MASS INDEX: 21.6 KG/M2 | HEIGHT: 60 IN | WEIGHT: 110 LBS

## 2025-08-14 DIAGNOSIS — M54.17 RADICULOPATHY, LUMBOSACRAL REGION: ICD-10-CM

## 2025-08-14 DIAGNOSIS — M79.18 MYALGIA, OTHER SITE: ICD-10-CM

## 2025-08-14 PROCEDURE — 20552 NJX 1/MLT TRIGGER POINT 1/2: CPT

## 2025-08-14 PROCEDURE — J3490M: CUSTOM | Mod: JZ

## 2025-08-14 PROCEDURE — 99214 OFFICE O/P EST MOD 30 MIN: CPT | Mod: 25
